# Patient Record
Sex: MALE | Race: WHITE
[De-identification: names, ages, dates, MRNs, and addresses within clinical notes are randomized per-mention and may not be internally consistent; named-entity substitution may affect disease eponyms.]

---

## 2019-08-06 ENCOUNTER — APPOINTMENT (OUTPATIENT)
Dept: FAMILY MEDICINE | Facility: CLINIC | Age: 34
End: 2019-08-06

## 2023-02-23 ENCOUNTER — APPOINTMENT (OUTPATIENT)
Dept: INTERNAL MEDICINE | Facility: CLINIC | Age: 38
End: 2023-02-23
Payer: COMMERCIAL

## 2023-02-23 VITALS
HEART RATE: 79 BPM | DIASTOLIC BLOOD PRESSURE: 80 MMHG | HEIGHT: 73 IN | RESPIRATION RATE: 16 BRPM | SYSTOLIC BLOOD PRESSURE: 122 MMHG | OXYGEN SATURATION: 98 % | TEMPERATURE: 97.6 F | BODY MASS INDEX: 28.49 KG/M2 | WEIGHT: 215 LBS

## 2023-02-23 DIAGNOSIS — R10.9 UNSPECIFIED ABDOMINAL PAIN: ICD-10-CM

## 2023-02-23 DIAGNOSIS — Z83.3 FAMILY HISTORY OF DIABETES MELLITUS: ICD-10-CM

## 2023-02-23 DIAGNOSIS — Z00.00 ENCOUNTER FOR GENERAL ADULT MEDICAL EXAMINATION W/OUT ABNORMAL FINDINGS: ICD-10-CM

## 2023-02-23 DIAGNOSIS — Z82.0 FAMILY HISTORY OF EPILEPSY AND OTHER DISEASES OF THE NERVOUS SYSTEM: ICD-10-CM

## 2023-02-23 PROCEDURE — 99385 PREV VISIT NEW AGE 18-39: CPT

## 2023-02-23 NOTE — HISTORY OF PRESENT ILLNESS
[FreeTextEntry1] : New patient [de-identified] : Hasnt been to doctor needs physical and dad was recently dx diabetes\par \par occ abd pain last week had after [employer] egg [employer] cheese; had diarrhea

## 2023-02-23 NOTE — HEALTH RISK ASSESSMENT
[Very Good] : ~his/her~  mood as very good [Yes] : Yes [Monthly or less (1 pt)] : Monthly or less (1 point) [1 or 2 (0 pts)] : 1 or 2 (0 points) [Never (0 pts)] : Never (0 points) [No] : In the past 12 months have you used drugs other than those required for medical reasons? No [No falls in past year] : Patient reported no falls in the past year [0] : 2) Feeling down, depressed, or hopeless: Not at all (0) [PHQ-2 Negative - No further assessment needed] : PHQ-2 Negative - No further assessment needed [HIV test declined] : HIV test declined [Hepatitis C test declined] : Hepatitis C test declined [None] : None [With Significant Other] : lives with significant other [Employed] : employed [] :  [# Of Children ___] : has [unfilled] children [Sexually Active] : sexually active [Feels Safe at Home] : Feels safe at home [Fully functional (bathing, dressing, toileting, transferring, walking, feeding)] : Fully functional (bathing, dressing, toileting, transferring, walking, feeding) [Fully functional (using the telephone, shopping, preparing meals, housekeeping, doing laundry, using] : Fully functional and needs no help or supervision to perform IADLs (using the telephone, shopping, preparing meals, housekeeping, doing laundry, using transportation, managing medications and managing finances) [de-identified] : not really does walking and plays some soccer [de-identified] : ok [[id number]] : 0 [Change in mental status noted] : No change in mental status noted [Language] : denies difficulty with language [Behavior] : denies difficulty with behavior [Learning/Retaining New Information] : denies difficulty learning/retaining new information [Handling Complex Tasks] : denies difficulty handling complex tasks [Reasoning] : denies difficulty with reasoning [Spatial Ability and Orientation] : denies difficulty with spatial ability and orientation [Reports changes in hearing] : Reports no changes in hearing [Reports changes in vision] : Reports no changes in vision [Reports changes in dental health] : Reports no changes in dental health [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

## 2023-03-21 ENCOUNTER — APPOINTMENT (OUTPATIENT)
Dept: ULTRASOUND IMAGING | Facility: HOSPITAL | Age: 38
End: 2023-03-21
Payer: COMMERCIAL

## 2023-03-21 ENCOUNTER — OUTPATIENT (OUTPATIENT)
Dept: OUTPATIENT SERVICES | Facility: HOSPITAL | Age: 38
LOS: 1 days | End: 2023-03-21
Payer: COMMERCIAL

## 2023-03-21 DIAGNOSIS — R10.9 UNSPECIFIED ABDOMINAL PAIN: ICD-10-CM

## 2023-03-21 PROCEDURE — 76700 US EXAM ABDOM COMPLETE: CPT | Mod: 26

## 2023-03-21 PROCEDURE — 76700 US EXAM ABDOM COMPLETE: CPT

## 2023-03-27 ENCOUNTER — APPOINTMENT (OUTPATIENT)
Dept: INTERNAL MEDICINE | Facility: CLINIC | Age: 38
End: 2023-03-27
Payer: COMMERCIAL

## 2023-03-27 VITALS
HEART RATE: 76 BPM | OXYGEN SATURATION: 98 % | SYSTOLIC BLOOD PRESSURE: 118 MMHG | HEIGHT: 73 IN | BODY MASS INDEX: 28.49 KG/M2 | WEIGHT: 215 LBS | DIASTOLIC BLOOD PRESSURE: 80 MMHG | TEMPERATURE: 97.5 F | RESPIRATION RATE: 16 BRPM

## 2023-03-27 DIAGNOSIS — K29.00 ACUTE GASTRITIS W/OUT BLEEDING: ICD-10-CM

## 2023-03-27 DIAGNOSIS — D18.03 HEMANGIOMA OF INTRA-ABDOMINAL STRUCTURES: ICD-10-CM

## 2023-03-27 DIAGNOSIS — R73.03 PREDIABETES.: ICD-10-CM

## 2023-03-27 DIAGNOSIS — E55.9 VITAMIN D DEFICIENCY, UNSPECIFIED: ICD-10-CM

## 2023-03-27 DIAGNOSIS — K76.0 FATTY (CHANGE OF) LIVER, NOT ELSEWHERE CLASSIFIED: ICD-10-CM

## 2023-03-27 PROCEDURE — 99214 OFFICE O/P EST MOD 30 MIN: CPT

## 2023-03-27 RX ORDER — MULTIVIT WITH MIN/MFOLATE/K2 340-15/3 G
125 MCG POWDER (GRAM) ORAL
Qty: 90 | Refills: 1 | Status: ACTIVE | COMMUNITY
Start: 2023-03-27 | End: 1900-01-01

## 2023-03-27 RX ORDER — OMEPRAZOLE 40 MG/1
40 CAPSULE, DELAYED RELEASE ORAL
Qty: 30 | Refills: 3 | Status: ACTIVE | COMMUNITY
Start: 2023-02-23

## 2023-03-27 NOTE — HISTORY OF PRESENT ILLNESS
[FreeTextEntry1] : f/u abd pain [de-identified] : feels better on PPI no abd pain\par \par blood tests reviewed +HSV1 occ oral ulcers

## 2023-04-10 LAB
25(OH)D3 SERPL-MCNC: 9.5 NG/ML
ALBUMIN SERPL ELPH-MCNC: 4.6 G/DL
ALP BLD-CCNC: 99 U/L
ALT SERPL-CCNC: 29 U/L
AMYLASE/CREAT SERPL: 85 U/L
ANION GAP SERPL CALC-SCNC: 14 MMOL/L
APPEARANCE: CLEAR
AST SERPL-CCNC: 15 U/L
BACTERIA: NEGATIVE
BASOPHILS # BLD AUTO: 0.03 K/UL
BASOPHILS NFR BLD AUTO: 0.4 %
BILIRUB SERPL-MCNC: 0.2 MG/DL
BILIRUBIN URINE: NEGATIVE
BLOOD URINE: NEGATIVE
BUN SERPL-MCNC: 15 MG/DL
C PNEUM IGG SER QL: ABNORMAL TITER
C PNEUM IGM SER QL: NORMAL TITER
C PSITTACI IGG SER QL: NORMAL TITER
C PSITTACI IGM SER QL: NORMAL TITER
C TRACH B IGG SER QL: NORMAL TITER
C TRACH B IGM SER QL: NORMAL TITER
C TRACH RRNA SPEC QL NAA+PROBE: NOT DETECTED
CALCIUM SERPL-MCNC: 9.8 MG/DL
CHLORIDE SERPL-SCNC: 106 MMOL/L
CHOLEST SERPL-MCNC: 182 MG/DL
CO2 SERPL-SCNC: 22 MMOL/L
COLOR: NORMAL
CREAT SERPL-MCNC: 1.13 MG/DL
EGFR: 85 ML/MIN/1.73M2
EOSINOPHIL # BLD AUTO: 0.3 K/UL
EOSINOPHIL NFR BLD AUTO: 3.5 %
ESTIMATED AVERAGE GLUCOSE: 126 MG/DL
GLUCOSE QUALITATIVE U: NEGATIVE
GLUCOSE SERPL-MCNC: 111 MG/DL
HAV IGM SER QL: NONREACTIVE
HBA1C MFR BLD HPLC: 6 %
HBV CORE IGM SER QL: NONREACTIVE
HBV SURFACE AG SER QL: NONREACTIVE
HCT VFR BLD CALC: 48.5 %
HCV AB SER QL: NONREACTIVE
HCV S/CO RATIO: 0.09 S/CO
HDLC SERPL-MCNC: 33 MG/DL
HGB BLD-MCNC: 15.3 G/DL
HIV1+2 AB SPEC QL IA.RAPID: NONREACTIVE
HSV 1+2 IGG SER IA-IMP: NEGATIVE
HSV 1+2 IGG SER IA-IMP: POSITIVE
HSV1 IGG SER QL: 40.6 INDEX
HSV1 IGM SER QL: NEGATIVE
HSV2 AB FLD-ACNC: NEGATIVE
HSV2 IGG SER QL: 0.07 INDEX
HYALINE CASTS: 0 /LPF
IMM GRANULOCYTES NFR BLD AUTO: 0.2 %
KETONES URINE: NEGATIVE
LDLC SERPL CALC-MCNC: 115 MG/DL
LEUKOCYTE ESTERASE URINE: NEGATIVE
LPL SERPL-CCNC: 42 U/L
LYMPHOCYTES # BLD AUTO: 3.31 K/UL
LYMPHOCYTES NFR BLD AUTO: 38.7 %
MAN DIFF?: NORMAL
MCHC RBC-ENTMCNC: 27.8 PG
MCHC RBC-ENTMCNC: 31.5 GM/DL
MCV RBC AUTO: 88 FL
MICROSCOPIC-UA: NORMAL
MONOCYTES # BLD AUTO: 0.66 K/UL
MONOCYTES NFR BLD AUTO: 7.7 %
N GONORRHOEA RRNA SPEC QL NAA+PROBE: NOT DETECTED
NEUTROPHILS # BLD AUTO: 4.24 K/UL
NEUTROPHILS NFR BLD AUTO: 49.5 %
NITRITE URINE: NEGATIVE
NONHDLC SERPL-MCNC: 149 MG/DL
PH URINE: 6
PLATELET # BLD AUTO: 280 K/UL
POTASSIUM SERPL-SCNC: 4.4 MMOL/L
PROT SERPL-MCNC: 7.1 G/DL
PROTEIN URINE: NEGATIVE
PSA SERPL-MCNC: 0.96 NG/ML
RBC # BLD: 5.51 M/UL
RBC # FLD: 13 %
RED BLOOD CELLS URINE: 0 /HPF
SODIUM SERPL-SCNC: 142 MMOL/L
SOURCE AMPLIFICATION: NORMAL
SPECIFIC GRAVITY URINE: 1.02
SQUAMOUS EPITHELIAL CELLS: 0 /HPF
T PALLIDUM AB SER QL IA: NEGATIVE
TRIGL SERPL-MCNC: 168 MG/DL
TSH SERPL-ACNC: 1.32 UIU/ML
UROBILINOGEN URINE: NORMAL
WBC # FLD AUTO: 8.56 K/UL
WHITE BLOOD CELLS URINE: 0 /HPF

## 2024-10-31 ENCOUNTER — NON-APPOINTMENT (OUTPATIENT)
Age: 39
End: 2024-10-31